# Patient Record
Sex: FEMALE | Race: BLACK OR AFRICAN AMERICAN | NOT HISPANIC OR LATINO
[De-identification: names, ages, dates, MRNs, and addresses within clinical notes are randomized per-mention and may not be internally consistent; named-entity substitution may affect disease eponyms.]

---

## 2017-01-10 ENCOUNTER — APPOINTMENT (OUTPATIENT)
Dept: OBGYN | Facility: CLINIC | Age: 57
End: 2017-01-10

## 2017-01-10 ENCOUNTER — OTHER (OUTPATIENT)
Age: 57
End: 2017-01-10

## 2017-05-17 ENCOUNTER — APPOINTMENT (OUTPATIENT)
Dept: OBGYN | Facility: CLINIC | Age: 57
End: 2017-05-17

## 2017-05-17 VITALS
HEIGHT: 62 IN | BODY MASS INDEX: 34.78 KG/M2 | WEIGHT: 189 LBS | DIASTOLIC BLOOD PRESSURE: 108 MMHG | HEART RATE: 60 BPM | SYSTOLIC BLOOD PRESSURE: 203 MMHG

## 2018-02-05 ENCOUNTER — EMERGENCY (EMERGENCY)
Facility: HOSPITAL | Age: 58
LOS: 0 days | Discharge: HOME | End: 2018-02-05
Attending: EMERGENCY MEDICINE
Payer: COMMERCIAL

## 2018-02-05 VITALS
WEIGHT: 186.95 LBS | SYSTOLIC BLOOD PRESSURE: 186 MMHG | DIASTOLIC BLOOD PRESSURE: 81 MMHG | HEIGHT: 62 IN | TEMPERATURE: 101 F | RESPIRATION RATE: 16 BRPM | HEART RATE: 76 BPM | OXYGEN SATURATION: 97 %

## 2018-02-05 DIAGNOSIS — R50.9 FEVER, UNSPECIFIED: ICD-10-CM

## 2018-02-05 DIAGNOSIS — R05 COUGH: ICD-10-CM

## 2018-02-05 DIAGNOSIS — J40 BRONCHITIS, NOT SPECIFIED AS ACUTE OR CHRONIC: ICD-10-CM

## 2018-02-05 DIAGNOSIS — I10 ESSENTIAL (PRIMARY) HYPERTENSION: ICD-10-CM

## 2018-02-05 PROCEDURE — 71046 X-RAY EXAM CHEST 2 VIEWS: CPT | Mod: 26

## 2018-02-05 PROCEDURE — 99284 EMERGENCY DEPT VISIT MOD MDM: CPT

## 2018-02-05 RX ORDER — IBUPROFEN 200 MG
1 TABLET ORAL
Qty: 12 | Refills: 0
Start: 2018-02-05 | End: 2018-02-07

## 2018-02-05 RX ORDER — AZITHROMYCIN 500 MG/1
1 TABLET, FILM COATED ORAL
Qty: 5 | Refills: 0 | OUTPATIENT
Start: 2018-02-05 | End: 2018-02-09

## 2018-02-05 RX ORDER — ACETAMINOPHEN 500 MG
975 TABLET ORAL ONCE
Qty: 0 | Refills: 0 | Status: COMPLETED | OUTPATIENT
Start: 2018-02-05 | End: 2018-02-05

## 2018-02-05 RX ADMIN — Medication 975 MILLIGRAM(S): at 19:02

## 2018-02-05 NOTE — ED PROVIDER NOTE - OBJECTIVE STATEMENT
57F history of HTN, HL, here with cough and fever x 2 days, associated with congestion.  No sore throat.  No fatigue or body aches. No nausea, vomiting, or diarrhea. No sick contacts, recent travel, night sweats or weight loss.

## 2018-02-05 NOTE — ED PROVIDER NOTE - MEDICAL DECISION MAKING DETAILS
normal... patient presents with cough, fever and congestion. Consider URI vs bronchitis vs PNA. Plan for CXR, tylenol for pain, outpt f/u

## 2018-02-05 NOTE — ED ADULT TRIAGE NOTE - CHIEF COMPLAINT QUOTE
cough pain left side and lower back pains. She was wheezing since yesterday and SOB with walking. She feels worse today

## 2018-02-05 NOTE — ED PROVIDER NOTE - ATTENDING CONTRIBUTION TO CARE
57 year old female PMHx HTN c/o cough x few days. PE: NAD, lungs clear, CV RRR. I&P: bronchitis, abx, supportive care, PMD follow up

## 2018-03-12 ENCOUNTER — EMERGENCY (EMERGENCY)
Facility: HOSPITAL | Age: 58
LOS: 0 days | Discharge: HOME | End: 2018-03-12
Attending: EMERGENCY MEDICINE
Payer: COMMERCIAL

## 2018-03-12 VITALS
SYSTOLIC BLOOD PRESSURE: 156 MMHG | RESPIRATION RATE: 18 BRPM | OXYGEN SATURATION: 97 % | TEMPERATURE: 98 F | HEART RATE: 58 BPM | DIASTOLIC BLOOD PRESSURE: 81 MMHG

## 2018-03-12 VITALS
SYSTOLIC BLOOD PRESSURE: 161 MMHG | DIASTOLIC BLOOD PRESSURE: 92 MMHG | HEART RATE: 56 BPM | TEMPERATURE: 99 F | OXYGEN SATURATION: 98 % | WEIGHT: 190.04 LBS | RESPIRATION RATE: 18 BRPM | HEIGHT: 62 IN

## 2018-03-12 DIAGNOSIS — R60.9 EDEMA, UNSPECIFIED: ICD-10-CM

## 2018-03-12 DIAGNOSIS — I10 ESSENTIAL (PRIMARY) HYPERTENSION: ICD-10-CM

## 2018-03-12 DIAGNOSIS — S83.209D UNSPECIFIED TEAR OF UNSPECIFIED MENISCUS, CURRENT INJURY, UNSPECIFIED KNEE, SUBSEQUENT ENCOUNTER: Chronic | ICD-10-CM

## 2018-03-12 PROCEDURE — 99284 EMERGENCY DEPT VISIT MOD MDM: CPT

## 2018-03-12 PROCEDURE — 93971 EXTREMITY STUDY: CPT | Mod: 26

## 2018-03-12 NOTE — ED ADULT NURSE NOTE - OBJECTIVE STATEMENT
pain in the back of the left knee radiating down the front and back of left leg. started last tues. now pt is experienced diff walking because now the leg is cramping and pt cannot bend knee easily. Sitting and standing difficulty.

## 2018-03-12 NOTE — ED PROVIDER NOTE - OBJECTIVE STATEMENT
57 year old female with PMH of HTN otherwise presenting with 1 week of discomfort behind left knee and some pain to left ankle, mild swelling on areas on and off. Denies any travel or previous hx of DVT and no trauma to leg.

## 2018-03-12 NOTE — ED PROVIDER NOTE - MEDICAL DECISION MAKING DETAILS
no DVT noted to take ibuprofen as needed for joint/knee/ankle discomfort - recommended PMD follow up if leg swelling persists - given elevation instructions.

## 2018-03-12 NOTE — ED PROVIDER NOTE - MUSCULOSKELETAL, MLM
Spine appears normal, range of motion is not limited, mild leg swelling behind left knee noted, mild swelling of ankle, nontender no discoloration good distal pulses

## 2018-03-17 ENCOUNTER — EMERGENCY (EMERGENCY)
Facility: HOSPITAL | Age: 58
LOS: 0 days | Discharge: HOME | End: 2018-03-17
Attending: EMERGENCY MEDICINE
Payer: COMMERCIAL

## 2018-03-17 VITALS
SYSTOLIC BLOOD PRESSURE: 170 MMHG | HEART RATE: 60 BPM | HEIGHT: 62 IN | TEMPERATURE: 98 F | DIASTOLIC BLOOD PRESSURE: 78 MMHG | WEIGHT: 188.05 LBS | RESPIRATION RATE: 16 BRPM

## 2018-03-17 VITALS
HEART RATE: 55 BPM | SYSTOLIC BLOOD PRESSURE: 189 MMHG | TEMPERATURE: 98 F | DIASTOLIC BLOOD PRESSURE: 117 MMHG | RESPIRATION RATE: 98 BRPM

## 2018-03-17 DIAGNOSIS — M25.561 PAIN IN RIGHT KNEE: ICD-10-CM

## 2018-03-17 DIAGNOSIS — S83.91XA SPRAIN OF UNSPECIFIED SITE OF RIGHT KNEE, INITIAL ENCOUNTER: ICD-10-CM

## 2018-03-17 DIAGNOSIS — I10 ESSENTIAL (PRIMARY) HYPERTENSION: ICD-10-CM

## 2018-03-17 DIAGNOSIS — S83.209D UNSPECIFIED TEAR OF UNSPECIFIED MENISCUS, CURRENT INJURY, UNSPECIFIED KNEE, SUBSEQUENT ENCOUNTER: Chronic | ICD-10-CM
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
## 2018-03-17 DIAGNOSIS — Z79.1 LONG TERM (CURRENT) USE OF NON-STEROIDAL ANTI-INFLAMMATORIES (NSAID): ICD-10-CM
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
## 2018-03-17 LAB
ALBUMIN SERPL ELPH-MCNC: 3.8 G/DL — SIGNIFICANT CHANGE UP (ref 3.3–5)
ALP SERPL-CCNC: 76 U/L — SIGNIFICANT CHANGE UP (ref 40–120)
ALT FLD-CCNC: 26 U/L — SIGNIFICANT CHANGE UP (ref 12–78)
ANION GAP SERPL CALC-SCNC: 7 MMOL/L — SIGNIFICANT CHANGE UP (ref 5–17)
AST SERPL-CCNC: 26 U/L — SIGNIFICANT CHANGE UP (ref 15–37)
BASOPHILS # BLD AUTO: 0.04 K/UL — SIGNIFICANT CHANGE UP (ref 0–0.2)
BASOPHILS NFR BLD AUTO: 0.6 % — SIGNIFICANT CHANGE UP (ref 0–2)
BILIRUB SERPL-MCNC: 0.5 MG/DL — SIGNIFICANT CHANGE UP (ref 0.2–1.2)
BUN SERPL-MCNC: 15 MG/DL — SIGNIFICANT CHANGE UP (ref 7–23)
CALCIUM SERPL-MCNC: 9.5 MG/DL — SIGNIFICANT CHANGE UP (ref 8.5–10.1)
CHLORIDE SERPL-SCNC: 107 MMOL/L — SIGNIFICANT CHANGE UP (ref 96–108)
CK SERPL-CCNC: 174 U/L — SIGNIFICANT CHANGE UP (ref 26–192)
CO2 SERPL-SCNC: 28 MMOL/L — SIGNIFICANT CHANGE UP (ref 22–31)
CREAT SERPL-MCNC: 0.94 MG/DL — SIGNIFICANT CHANGE UP (ref 0.5–1.3)
EOSINOPHIL # BLD AUTO: 0.14 K/UL — SIGNIFICANT CHANGE UP (ref 0–0.5)
EOSINOPHIL NFR BLD AUTO: 2.1 % — SIGNIFICANT CHANGE UP (ref 0–6)
GLUCOSE SERPL-MCNC: 99 MG/DL — SIGNIFICANT CHANGE UP (ref 70–99)
HCT VFR BLD CALC: 36.6 % — SIGNIFICANT CHANGE UP (ref 34.5–45)
HGB BLD-MCNC: 12 G/DL — SIGNIFICANT CHANGE UP (ref 11.5–15.5)
IMM GRANULOCYTES NFR BLD AUTO: 0.6 % — SIGNIFICANT CHANGE UP (ref 0–1.5)
LYMPHOCYTES # BLD AUTO: 2.52 K/UL — SIGNIFICANT CHANGE UP (ref 1–3.3)
LYMPHOCYTES # BLD AUTO: 37.6 % — SIGNIFICANT CHANGE UP (ref 13–44)
MCHC RBC-ENTMCNC: 28.6 PG — SIGNIFICANT CHANGE UP (ref 27–34)
MCHC RBC-ENTMCNC: 32.8 GM/DL — SIGNIFICANT CHANGE UP (ref 32–36)
MCV RBC AUTO: 87.4 FL — SIGNIFICANT CHANGE UP (ref 80–100)
MONOCYTES # BLD AUTO: 0.44 K/UL — SIGNIFICANT CHANGE UP (ref 0–0.9)
MONOCYTES NFR BLD AUTO: 6.6 % — SIGNIFICANT CHANGE UP (ref 2–14)
NEUTROPHILS # BLD AUTO: 3.52 K/UL — SIGNIFICANT CHANGE UP (ref 1.8–7.4)
NEUTROPHILS NFR BLD AUTO: 52.5 % — SIGNIFICANT CHANGE UP (ref 43–77)
NRBC # BLD: 0 /100 WBCS — SIGNIFICANT CHANGE UP (ref 0–0)
PLATELET # BLD AUTO: 302 K/UL — SIGNIFICANT CHANGE UP (ref 150–400)
POTASSIUM SERPL-MCNC: 3.9 MMOL/L — SIGNIFICANT CHANGE UP (ref 3.5–5.3)
POTASSIUM SERPL-SCNC: 3.9 MMOL/L — SIGNIFICANT CHANGE UP (ref 3.5–5.3)
PROT SERPL-MCNC: 8.3 GM/DL — SIGNIFICANT CHANGE UP (ref 6–8.3)
RBC # BLD: 4.19 M/UL — SIGNIFICANT CHANGE UP (ref 3.8–5.2)
RBC # FLD: 13.2 % — SIGNIFICANT CHANGE UP (ref 10.3–14.5)
SODIUM SERPL-SCNC: 142 MMOL/L — SIGNIFICANT CHANGE UP (ref 135–145)
WBC # BLD: 6.7 K/UL — SIGNIFICANT CHANGE UP (ref 3.8–10.5)
WBC # FLD AUTO: 6.7 K/UL — SIGNIFICANT CHANGE UP (ref 3.8–10.5)

## 2018-03-17 PROCEDURE — 93971 EXTREMITY STUDY: CPT | Mod: 26

## 2018-03-17 PROCEDURE — 99284 EMERGENCY DEPT VISIT MOD MDM: CPT | Mod: 25

## 2018-03-17 RX ORDER — TRAMADOL HYDROCHLORIDE 50 MG/1
1 TABLET ORAL
Qty: 8 | Refills: 0
Start: 2018-03-17 | End: 2018-03-18

## 2018-03-17 RX ORDER — OXYCODONE AND ACETAMINOPHEN 5; 325 MG/1; MG/1
1 TABLET ORAL ONCE
Qty: 0 | Refills: 0 | Status: DISCONTINUED | OUTPATIENT
Start: 2018-03-17 | End: 2018-03-17

## 2018-03-17 RX ADMIN — OXYCODONE AND ACETAMINOPHEN 1 TABLET(S): 5; 325 TABLET ORAL at 08:28

## 2018-03-17 NOTE — ED ADULT TRIAGE NOTE - CHIEF COMPLAINT QUOTE
Pain to left lower leg. Was seen last monday for same. Sono negative,ortho neg xray. Yesterday pain increased to 10/10. Cannot bear weightStill swollen Pain to left lower leg. Was seen last monday for same. Sono negative,ortho neg xray. Yesterday pain increased to 10/10. Cannot bear weightStill swollen.

## 2018-03-17 NOTE — ED ADULT NURSE NOTE - CAS EDN DISCHARGE ASSESSMENT
Alert and oriented to person, place and time Alert and oriented to person, place and time/No adverse reaction to first time med in ED

## 2018-03-17 NOTE — ED PROVIDER NOTE - MEDICAL DECISION MAKING DETAILS
Pt likely with knee sprain/meniscal injury. Sono again neg. Lab values do not require emergent intervention. knee immobilizer, crutches, fu with ortho. 8 tabs tramadol for breakthrough pain. Discussed results and outcome of testing with the patient.  Patient given copy of available results. Patient advised to please follow up with their PMD within the next 24 hours and return to the Emergency Department for worsening symptoms or any other concerns.

## 2018-03-17 NOTE — ED PROVIDER NOTE - PHYSICAL EXAMINATION
Gen: Alert, Well appearing. NAD    Head: NC, AT, PERRL, EOMI, normal lids/conjunctiva   ENT: Bilateral TM WNL, normal hearing, patent oropharynx without erythema/exudate, uvula midline  Neck: supple, no tenderness/meningismus/JVD   Pulm: Bilateral clear BS, normal resp effort, no wheeze/stridor/retractions  CV: RRR, no M/R/G, +dist pulses   Abd: soft, NT/ND, +BS, no guarding/rebound tenderness  Mskel: + pain mostly in rt medial knee joint. + mild tenderness to calf. pain on valgus testing. mild edema to calf. pulses and sensation intact.  Skin: no rash   Neuro: AAOx3, no sensory/motor deficits, CN 2-12 intact

## 2018-03-17 NOTE — ED ADULT NURSE NOTE - OBJECTIVE STATEMENT
Patient complains of pain behind the popliteal and on the lateral side of left leg. Pain radiates downward. Denies No shortness of breath, numbness or tingling. No swelling of extremity.

## 2018-03-17 NOTE — ED ADULT NURSE NOTE - CHIEF COMPLAINT QUOTE
Pain to left lower leg. Was seen last monday for same. Sono negative,ortho neg xray. Yesterday pain increased to 10/10. Cannot bear weightStill swollen

## 2018-03-17 NOTE — ED PROVIDER NOTE - OBJECTIVE STATEMENT
56yo female with pmh HTN, psh: RT knee meniscus repair present with left post knee pain radiating to calf x 2 weeks. Pt now unable to bear wt. Pt seen last week with normal US, and seen by ortho, with neg knee xray. Pt on diclofenac which she took this morning but unable to bear wt. denies trauma. denies h/o dvt/pe.     ROS: No fever/chills. No photophobia/eye pain/changes in vision, No ear pain/sore throat/dysphagia, No chest pain/palpitations. No SOB/cough/stridor. No abdominal pain, N/V/D, no black/bloody bm. No dysuria/frequency/discharge, No headache. No Dizziness.  No rash.  No numbness/tingling/weakness.

## 2018-03-26 ENCOUNTER — TRANSCRIPTION ENCOUNTER (OUTPATIENT)
Age: 58
End: 2018-03-26

## 2018-08-24 PROBLEM — I10 ESSENTIAL (PRIMARY) HYPERTENSION: Chronic | Status: ACTIVE | Noted: 2018-02-05

## 2018-10-08 ENCOUNTER — APPOINTMENT (OUTPATIENT)
Dept: OBGYN | Facility: CLINIC | Age: 58
End: 2018-10-08
Payer: COMMERCIAL

## 2018-10-08 VITALS
SYSTOLIC BLOOD PRESSURE: 159 MMHG | HEIGHT: 62 IN | WEIGHT: 190 LBS | BODY MASS INDEX: 34.96 KG/M2 | HEART RATE: 52 BPM | DIASTOLIC BLOOD PRESSURE: 92 MMHG

## 2018-10-08 DIAGNOSIS — N39.46 MIXED INCONTINENCE: ICD-10-CM

## 2018-10-08 PROCEDURE — 99396 PREV VISIT EST AGE 40-64: CPT

## 2018-10-09 LAB — HPV HIGH+LOW RISK DNA PNL CVX: NOT DETECTED

## 2018-10-10 LAB — BACTERIA UR CULT: NORMAL

## 2018-10-15 LAB — CYTOLOGY CVX/VAG DOC THIN PREP: NORMAL

## 2018-10-19 ENCOUNTER — APPOINTMENT (OUTPATIENT)
Dept: UROGYNECOLOGY | Facility: CLINIC | Age: 58
End: 2018-10-19
Payer: COMMERCIAL

## 2018-10-19 VITALS
SYSTOLIC BLOOD PRESSURE: 158 MMHG | WEIGHT: 191 LBS | DIASTOLIC BLOOD PRESSURE: 82 MMHG | HEIGHT: 62 IN | HEART RATE: 68 BPM | BODY MASS INDEX: 35.15 KG/M2

## 2018-10-19 LAB
BILIRUB UR QL STRIP: NORMAL
CLARITY UR: CLEAR
COLLECTION METHOD: NORMAL
GLUCOSE UR-MCNC: NORMAL
HCG UR QL: 0.2 EU/DL
HGB UR QL STRIP.AUTO: NORMAL
KETONES UR-MCNC: NORMAL
LEUKOCYTE ESTERASE UR QL STRIP: NORMAL
NITRITE UR QL STRIP: NORMAL
PH UR STRIP: 5.5
PROT UR STRIP-MCNC: NORMAL
SP GR UR STRIP: 1.03

## 2018-10-19 PROCEDURE — 81003 URINALYSIS AUTO W/O SCOPE: CPT | Mod: QW

## 2018-10-19 PROCEDURE — 51725 SIMPLE CYSTOMETROGRAM: CPT

## 2018-10-19 PROCEDURE — 51741 ELECTRO-UROFLOWMETRY FIRST: CPT

## 2018-10-19 PROCEDURE — 99204 OFFICE O/P NEW MOD 45 MIN: CPT | Mod: 25

## 2018-12-04 ENCOUNTER — APPOINTMENT (OUTPATIENT)
Dept: UROGYNECOLOGY | Facility: CLINIC | Age: 58
End: 2018-12-04
Payer: COMMERCIAL

## 2018-12-04 PROCEDURE — 51798 US URINE CAPACITY MEASURE: CPT

## 2018-12-04 PROCEDURE — 99214 OFFICE O/P EST MOD 30 MIN: CPT | Mod: 25

## 2019-10-29 ENCOUNTER — APPOINTMENT (OUTPATIENT)
Dept: OBGYN | Facility: CLINIC | Age: 59
End: 2019-10-29
Payer: COMMERCIAL

## 2019-10-29 VITALS
WEIGHT: 193.56 LBS | BODY MASS INDEX: 35.62 KG/M2 | DIASTOLIC BLOOD PRESSURE: 75 MMHG | HEIGHT: 62 IN | SYSTOLIC BLOOD PRESSURE: 172 MMHG | HEART RATE: 63 BPM

## 2019-10-29 DIAGNOSIS — N32.81 OVERACTIVE BLADDER: ICD-10-CM

## 2019-10-29 PROCEDURE — 99396 PREV VISIT EST AGE 40-64: CPT

## 2019-10-29 NOTE — CHIEF COMPLAINT
[Annual Visit] : annual visit [FreeTextEntry1] : 59 y.o. P 1011 postmenopausal female for annual exam. pt feels well. pt has HTN and Hypercholesterolemia, followed by Dr. Gilliland. pt also diagnosed with Urge Incontinence , by Rodri Solis, treated with Oxybutinin. . pt is due for Dexa. this year. Feb. 2019, Mammo - Birads -1 at [facility]. pt had colonoscopy in feb. 2018, , follow up in 3 years secondary to hx of colon ca in mother, . pap of 2018 was normal next due in 2021. pt is retiring Nov. 15th, from [employer]. pt has arthritis in both knees. pt is not sexually active,

## 2019-11-30 ENCOUNTER — EMERGENCY (EMERGENCY)
Facility: HOSPITAL | Age: 59
LOS: 0 days | Discharge: HOME | End: 2019-11-30
Attending: EMERGENCY MEDICINE
Payer: COMMERCIAL

## 2019-11-30 VITALS
HEART RATE: 62 BPM | RESPIRATION RATE: 18 BRPM | SYSTOLIC BLOOD PRESSURE: 153 MMHG | HEIGHT: 62 IN | DIASTOLIC BLOOD PRESSURE: 97 MMHG | OXYGEN SATURATION: 98 % | TEMPERATURE: 98 F | WEIGHT: 195.11 LBS

## 2019-11-30 VITALS
OXYGEN SATURATION: 98 % | HEART RATE: 55 BPM | RESPIRATION RATE: 15 BRPM | TEMPERATURE: 98 F | DIASTOLIC BLOOD PRESSURE: 64 MMHG

## 2019-11-30 DIAGNOSIS — I10 ESSENTIAL (PRIMARY) HYPERTENSION: ICD-10-CM

## 2019-11-30 DIAGNOSIS — R07.9 CHEST PAIN, UNSPECIFIED: ICD-10-CM

## 2019-11-30 DIAGNOSIS — S83.209D UNSPECIFIED TEAR OF UNSPECIFIED MENISCUS, CURRENT INJURY, UNSPECIFIED KNEE, SUBSEQUENT ENCOUNTER: Chronic | ICD-10-CM

## 2019-11-30 LAB
ALBUMIN SERPL ELPH-MCNC: 3.6 G/DL — SIGNIFICANT CHANGE UP (ref 3.3–5)
ALP SERPL-CCNC: 70 U/L — SIGNIFICANT CHANGE UP (ref 40–120)
ALT FLD-CCNC: 27 U/L — SIGNIFICANT CHANGE UP (ref 12–78)
ANION GAP SERPL CALC-SCNC: 6 MMOL/L — SIGNIFICANT CHANGE UP (ref 5–17)
APTT BLD: 27.8 SEC — LOW (ref 28.5–37)
AST SERPL-CCNC: 25 U/L — SIGNIFICANT CHANGE UP (ref 15–37)
BASOPHILS # BLD AUTO: 0.05 K/UL — SIGNIFICANT CHANGE UP (ref 0–0.2)
BASOPHILS NFR BLD AUTO: 0.6 % — SIGNIFICANT CHANGE UP (ref 0–2)
BILIRUB SERPL-MCNC: 0.4 MG/DL — SIGNIFICANT CHANGE UP (ref 0.2–1.2)
BUN SERPL-MCNC: 23 MG/DL — SIGNIFICANT CHANGE UP (ref 7–23)
CALCIUM SERPL-MCNC: 9.6 MG/DL — SIGNIFICANT CHANGE UP (ref 8.5–10.1)
CHLORIDE SERPL-SCNC: 108 MMOL/L — SIGNIFICANT CHANGE UP (ref 96–108)
CK MB BLD-MCNC: <0.7 % — SIGNIFICANT CHANGE UP (ref 0–3.5)
CK MB CFR SERPL CALC: <1 NG/ML — SIGNIFICANT CHANGE UP (ref 0.5–3.6)
CK SERPL-CCNC: 135 U/L — SIGNIFICANT CHANGE UP (ref 26–192)
CO2 SERPL-SCNC: 27 MMOL/L — SIGNIFICANT CHANGE UP (ref 22–31)
CREAT SERPL-MCNC: 1.08 MG/DL — SIGNIFICANT CHANGE UP (ref 0.5–1.3)
EOSINOPHIL # BLD AUTO: 0.1 K/UL — SIGNIFICANT CHANGE UP (ref 0–0.5)
EOSINOPHIL NFR BLD AUTO: 1.2 % — SIGNIFICANT CHANGE UP (ref 0–6)
GLUCOSE SERPL-MCNC: 96 MG/DL — SIGNIFICANT CHANGE UP (ref 70–99)
HCT VFR BLD CALC: 35.1 % — SIGNIFICANT CHANGE UP (ref 34.5–45)
HGB BLD-MCNC: 11.4 G/DL — LOW (ref 11.5–15.5)
IMM GRANULOCYTES NFR BLD AUTO: 0.4 % — SIGNIFICANT CHANGE UP (ref 0–1.5)
INR BLD: 1.07 RATIO — SIGNIFICANT CHANGE UP (ref 0.88–1.16)
LYMPHOCYTES # BLD AUTO: 2.85 K/UL — SIGNIFICANT CHANGE UP (ref 1–3.3)
LYMPHOCYTES # BLD AUTO: 33.6 % — SIGNIFICANT CHANGE UP (ref 13–44)
MCHC RBC-ENTMCNC: 28.6 PG — SIGNIFICANT CHANGE UP (ref 27–34)
MCHC RBC-ENTMCNC: 32.5 GM/DL — SIGNIFICANT CHANGE UP (ref 32–36)
MCV RBC AUTO: 88.2 FL — SIGNIFICANT CHANGE UP (ref 80–100)
MONOCYTES # BLD AUTO: 0.72 K/UL — SIGNIFICANT CHANGE UP (ref 0–0.9)
MONOCYTES NFR BLD AUTO: 8.5 % — SIGNIFICANT CHANGE UP (ref 2–14)
NEUTROPHILS # BLD AUTO: 4.73 K/UL — SIGNIFICANT CHANGE UP (ref 1.8–7.4)
NEUTROPHILS NFR BLD AUTO: 55.7 % — SIGNIFICANT CHANGE UP (ref 43–77)
NRBC # BLD: 0 /100 WBCS — SIGNIFICANT CHANGE UP (ref 0–0)
PLATELET # BLD AUTO: 273 K/UL — SIGNIFICANT CHANGE UP (ref 150–400)
POTASSIUM SERPL-MCNC: 4.4 MMOL/L — SIGNIFICANT CHANGE UP (ref 3.5–5.3)
POTASSIUM SERPL-SCNC: 4.4 MMOL/L — SIGNIFICANT CHANGE UP (ref 3.5–5.3)
PROT SERPL-MCNC: 7.8 GM/DL — SIGNIFICANT CHANGE UP (ref 6–8.3)
PROTHROM AB SERPL-ACNC: 12 SEC — SIGNIFICANT CHANGE UP (ref 10–12.9)
RBC # BLD: 3.98 M/UL — SIGNIFICANT CHANGE UP (ref 3.8–5.2)
RBC # FLD: 12.4 % — SIGNIFICANT CHANGE UP (ref 10.3–14.5)
SODIUM SERPL-SCNC: 141 MMOL/L — SIGNIFICANT CHANGE UP (ref 135–145)
TROPONIN I SERPL-MCNC: <.015 NG/ML — SIGNIFICANT CHANGE UP (ref 0.01–0.04)
TROPONIN I SERPL-MCNC: <.015 NG/ML — SIGNIFICANT CHANGE UP (ref 0.01–0.04)
WBC # BLD: 8.48 K/UL — SIGNIFICANT CHANGE UP (ref 3.8–10.5)
WBC # FLD AUTO: 8.48 K/UL — SIGNIFICANT CHANGE UP (ref 3.8–10.5)

## 2019-11-30 PROCEDURE — 93010 ELECTROCARDIOGRAM REPORT: CPT

## 2019-11-30 PROCEDURE — 71045 X-RAY EXAM CHEST 1 VIEW: CPT | Mod: 26

## 2019-11-30 PROCEDURE — 99285 EMERGENCY DEPT VISIT HI MDM: CPT

## 2019-11-30 RX ORDER — LOSARTAN POTASSIUM 100 MG/1
1 TABLET, FILM COATED ORAL
Qty: 0 | Refills: 0 | DISCHARGE

## 2019-11-30 RX ORDER — METOPROLOL TARTRATE 50 MG
1 TABLET ORAL
Qty: 0 | Refills: 0 | DISCHARGE

## 2019-11-30 RX ORDER — ASPIRIN/CALCIUM CARB/MAGNESIUM 324 MG
243 TABLET ORAL ONCE
Refills: 0 | Status: COMPLETED | OUTPATIENT
Start: 2019-11-30 | End: 2019-11-30

## 2019-11-30 RX ORDER — ASPIRIN/CALCIUM CARB/MAGNESIUM 324 MG
243 TABLET ORAL ONCE
Refills: 0 | Status: DISCONTINUED | OUTPATIENT
Start: 2019-11-30 | End: 2019-11-30

## 2019-11-30 RX ADMIN — Medication 243 MILLIGRAM(S): at 20:31

## 2019-11-30 RX ADMIN — Medication 0.1 MILLIGRAM(S): at 21:27

## 2019-11-30 NOTE — ED PROVIDER NOTE - PATIENT PORTAL LINK FT
You can access the FollowMyHealth Patient Portal offered by [facility] by registering at the following website: [email or web address]. By joining [employer]’s FollowMyHealth portal, you will also be able to view your health information using other applications (apps) compatible with our system.

## 2019-11-30 NOTE — ED ADULT NURSE NOTE - OBJECTIVE STATEMENT
pt c/o "stabbing" pains R-side of chest around noon with headache,  pt took ASA 81mg at 5pm. at this time pt denies any pain.  (PMH- HTN, GERD)

## 2019-11-30 NOTE — ED PROVIDER NOTE - OBJECTIVE STATEMENT
59 year old female with PMH of HTN, HLD otherwise with no significant cardiac hx, pt states having CP on and off since around 11am-12pm - states otherwise discomfort is for a few seconds on R sternal border and goes away quickly, not related to exertion. Denies any cough or URI or recent chest trauma.

## 2019-11-30 NOTE — ED ADULT NURSE NOTE - CHIEF COMPLAINT QUOTE
pt c/o "stabbing" pains R-side of chest since noon,  pt took ASA 81mg at 5pm.  pt also c/o headache.  (PMH- HTN, GERD)

## 2019-11-30 NOTE — ED ADULT NURSE NOTE - ED STAT RN HANDOFF DETAILS
Report endorsed to oncoming RN Rocyk. Safety checks compld this shift/Safety rounds completed hourly.  IV sites checked Q2+remains WDL. Meds given as ord with no s/s of adverse RXNs. Fall +skin precs in place. Any issues endorsed to oncoming RN for follow up. m

## 2020-04-16 NOTE — ED PROVIDER NOTE - NS_HEARTSCRISKFACTOR_ED_A_ED
shortness of breath
1 to 2 Risk Factors
shortness of breath
shortness of breath

## 2020-10-30 ENCOUNTER — APPOINTMENT (OUTPATIENT)
Dept: OBGYN | Facility: CLINIC | Age: 60
End: 2020-10-30
Payer: COMMERCIAL

## 2020-10-30 VITALS
DIASTOLIC BLOOD PRESSURE: 84 MMHG | HEART RATE: 56 BPM | WEIGHT: 178.06 LBS | BODY MASS INDEX: 32.77 KG/M2 | TEMPERATURE: 97.9 F | SYSTOLIC BLOOD PRESSURE: 154 MMHG | HEIGHT: 62 IN

## 2020-10-30 DIAGNOSIS — N95.1 MENOPAUSAL AND FEMALE CLIMACTERIC STATES: ICD-10-CM

## 2020-10-30 PROCEDURE — 99072 ADDL SUPL MATRL&STAF TM PHE: CPT

## 2020-10-30 PROCEDURE — 99396 PREV VISIT EST AGE 40-64: CPT

## 2020-10-30 NOTE — DISCUSSION/SUMMARY
[FreeTextEntry1] : A - WwV\par     s/p recent COVID infection\par      obesity\par      menopause\par     HTN\par     Hypercholesterolemia\par \par P- f/u 1 year\par      pap  - done  ( pt request)\par      referral for mammo, breast sono  , Dexa, and colonoscopy - given \par     pt advised to f/u with Cardiologist re: polypharmacy related to HTN\par      nutritional counseling provided \par     exercise encouraged\par    \par

## 2020-10-30 NOTE — HISTORY OF PRESENT ILLNESS
[FreeTextEntry1] : 60 y.o. P 1011 postmenopausal female for  annual exam. pt has HTN and Hypercholesterolemia, pt is on Losartan, Metoprolol, Livalo, and Amlodipine, prescribed by Dr. Javier Mayfield. . pt has hx of urge incontinence , pt had COVID pneumoniae, in March , was in [place],  with daughter, pt is now retired from [employer], . pt has hx of colon cancer, s/p colonosocpy in Feb. 2018, f/u is due in 2021, due to family hx. ( mother with colon ca). pt has arthritis in both knees. PCP is Dr. Gilliland\par pt had mammo in June of this year at [facility] in [facility] , " it was normal". ( no results).

## 2020-10-30 NOTE — PHYSICAL EXAM
[Appropriately responsive] : appropriately responsive [Alert] : alert [No Acute Distress] : no acute distress [No Lymphadenopathy] : no lymphadenopathy [Regular Rate Rhythm] : regular rate rhythm [No Murmurs] : no murmurs [Clear to Auscultation B/L] : clear to auscultation bilaterally [Soft] : soft [Non-tender] : non-tender [Non-distended] : non-distended [No HSM] : No HSM [No Lesions] : no lesions [No Mass] : no mass [Oriented x3] : oriented x3 [Examination Of The Breasts] : a normal appearance [No Masses] : no breast masses were palpable [Labia Majora] : normal [Labia Minora] : normal [Normal] : normal [Anteversion] : anteverted [Uterine Adnexae] : normal

## 2020-11-02 LAB
HPV HIGH+LOW RISK DNA PNL CVX: NOT DETECTED
SARS-COV-2 IGG SERPL IA-ACNC: 3.14 INDEX
SARS-COV-2 IGG SERPL QL IA: POSITIVE

## 2020-11-04 LAB — CYTOLOGY CVX/VAG DOC THIN PREP: NORMAL

## 2020-11-19 ENCOUNTER — NON-APPOINTMENT (OUTPATIENT)
Age: 60
End: 2020-11-19

## 2021-11-05 ENCOUNTER — APPOINTMENT (OUTPATIENT)
Dept: OBGYN | Facility: CLINIC | Age: 61
End: 2021-11-05
Payer: COMMERCIAL

## 2021-11-05 VITALS
HEART RATE: 55 BPM | SYSTOLIC BLOOD PRESSURE: 145 MMHG | WEIGHT: 180 LBS | BODY MASS INDEX: 33.13 KG/M2 | DIASTOLIC BLOOD PRESSURE: 80 MMHG | TEMPERATURE: 97 F | HEIGHT: 62 IN

## 2021-11-05 DIAGNOSIS — N95.0 POSTMENOPAUSAL BLEEDING: ICD-10-CM

## 2021-11-05 PROCEDURE — 99396 PREV VISIT EST AGE 40-64: CPT

## 2021-11-05 RX ORDER — OXYBUTYNIN CHLORIDE 5 MG/1
5 TABLET, EXTENDED RELEASE ORAL DAILY
Qty: 30 | Refills: 3 | Status: COMPLETED | COMMUNITY
Start: 2018-10-19 | End: 2021-11-05

## 2021-11-05 NOTE — HISTORY OF PRESENT ILLNESS
[FreeTextEntry1] : 61 y.o. P 1011 postmenopausal female for annual exam. . pt reports spotting at the end of Sept.  x 1 day. , PMH- HTN, pt is on  Losartan and Amlodipine followed by Dr. Gilliland , pt is on a statin Livalo pt is retired from [employer]. . pt now works for [employer] (so she can fly free to visit her daughter in [place]. , pt has family hx of colon ca- mother,, prostate ca- brother, , Uterine ca -sister . pt has hx of Arthritis in both knees.\par pt has prior hx of incontinence, resolved with discontinuance of coffee and soda. \par Nov. 2020 - Dexa - WNL\par July 2021 - Mammo - Birads-1\par Oct. 2020 - pap  - WNL. \par pt states she had colonoscopy this year. done by Dr. Conteh ( EGD and colonoscopy). - told to f/u in 5 years.

## 2021-11-05 NOTE — DISCUSSION/SUMMARY
[FreeTextEntry1] : A - WWV\par      HTN\par      postmenopausal spotting\par      family hx of cancer - 1st degree relatives \par \par P- f/u after results of pelvic sono return to assess endometrial thickness\par     COLOR testing today. \par      pap next due in 2023\par      exercises encouraged \par      nutritional counseling provided\par      pt had EGD and colonoscopy, this year in /May 2021\par      Dexa Nov. 2020- normal\par      mammo July 2021- Birads-1.

## 2021-12-01 ENCOUNTER — NON-APPOINTMENT (OUTPATIENT)
Age: 61
End: 2021-12-01

## 2021-12-03 ENCOUNTER — NON-APPOINTMENT (OUTPATIENT)
Age: 61
End: 2021-12-03

## 2021-12-04 ENCOUNTER — NON-APPOINTMENT (OUTPATIENT)
Age: 61
End: 2021-12-04

## 2021-12-06 ENCOUNTER — NON-APPOINTMENT (OUTPATIENT)
Age: 61
End: 2021-12-06

## 2021-12-09 ENCOUNTER — NON-APPOINTMENT (OUTPATIENT)
Age: 61
End: 2021-12-09

## 2021-12-13 ENCOUNTER — NON-APPOINTMENT (OUTPATIENT)
Age: 61
End: 2021-12-13

## 2022-03-23 NOTE — ED ADULT NURSE NOTE - CAS TRG GEN SKIN COLOR
Encounter for gynecological examination (general) (routine) without abnormal findings    Freda Escobar Normal findings on routine annual gyn exam  Recommended monthly SBE, annual CBE  Reviewed ASCCP guidelines and pap noted to be up to date  The patient reports she has completed Gardasil series  STI testing was offered and declined at this time; the patient is aware that condoms are recommended for all sexual contact for prevention of STI  The patient likes current contraceptive measure and desires to continue  Reviewed diet/activity recommendations and reasons to call  F/u in one year for routine annual gyn exam or sooner PRN  Encounter for surveillance of contraceptive pills    Happy with current OCP  Denies ACHES, breakthrough bleeding, nausea or other side effects  Refill given for one year  RTO 1 year or prn problems or concerns  Dyspareunia, female  Resolved after menses    Family history of breast cancer  PGM @ 36  No daughters  Elina's sister was tested and negative  Plans testing between 25-30  Diagnoses and all orders for this visit:    Encounter for gynecological examination (general) (routine) without abnormal findings    Encounter for surveillance of contraceptive pills    Dyspareunia, female  -     norethindrone-ethinyl estradiol (Loestrin Fe 1/20) 1-20 MG-MCG per tablet; Take 1 tablet by mouth daily    Family history of breast cancer         Elina Pfeiffer  1999    CC:  Yearly exam    S:  Quinn Campos is a 25 y o  female here for yearly exam  She denies breast concerns, abdominal/pelvic pain, abnormal vaginal discharge or bladder/bowel dysfunction  Her cycles are regular, not heavy or painful on OCP  Sexual activity: She is sexually active without pain, bleeding or dryness  She was seen 2/22 for dyspareunia and had ovarian cyst   Pain resolved after menses  No concerns  Contraception:  She uses OCP  for contraception  STD testing:  She does not request STD testing today  Gardasil:  She has had the Gardasil series  Last Pap: 3/21 neg    Family hx of breast cancer: PGM @ 36   Family hx of ovarian cancer: denies  Family hx of colon cancer: denies    She works remotely for marketing        Current Outpatient Medications:     norethindrone-ethinyl estradiol (Loestrin Fe 1/20) 1-20 MG-MCG per tablet, Take 1 tablet by mouth daily, Disp: 90 tablet, Rfl: 3  Social History     Socioeconomic History    Marital status: Single     Spouse name: Not on file    Number of children: Not on file    Years of education: Not on file    Highest education level: Not on file   Occupational History    Not on file   Tobacco Use    Smoking status: Never Smoker    Smokeless tobacco: Never Used   Vaping Use    Vaping Use: Not on file   Substance and Sexual Activity    Alcohol use: Not Currently    Drug use: No    Sexual activity: Yes     Partners: Male     Birth control/protection: OCP   Other Topics Concern    Not on file   Social History Narrative    Not on file     Social Determinants of Health     Financial Resource Strain: Not on file   Food Insecurity: Not on file   Transportation Needs: Not on file   Physical Activity: Not on file   Stress: Not on file   Social Connections: Not on file   Intimate Partner Violence: Not on file   Housing Stability: Not on file     Family History   Problem Relation Age of Onset    Obesity Mother     No Known Problems Father     No Known Problems Sister     Diabetes Maternal Grandmother     Hyperlipidemia Maternal Grandmother     Hypertension Maternal Grandmother     Heart disease Maternal Grandfather     Breast cancer Paternal Grandmother     Heart disease Paternal Grandfather      History reviewed  No pertinent past medical history  Review of Systems   Constitutional: Negative for appetite change, fatigue and unexpected weight change  Respiratory: Negative for shortness of breath      Cardiovascular: Negative for chest pain and leg swelling  Breasts:  negative for tenderness or masses  Gastrointestinal: Negative for abdominal pain  Endocrine: Negative for cold intolerance and heat intolerance  Genitourinary: Negative for dyspareunia, dysuria, flank pain, frequency, genital sores, hematuria, menstrual problem and pelvic pain  Musculoskeletal: Negative for back pain  Neurological: Negative for headaches  O:  Blood pressure 100/72, height 5' 8" (1 727 m), weight 68 8 kg (151 lb 9 6 oz), last menstrual period 03/15/2022  Patient appears well and is not in distress  ROM normal   Neck is supple without masses  Normal thyroid  Heart regular rate and rhythm  Lungs CTA bilaterally   Breasts are symmetrical without mass, tenderness, nipple discharge, skin changes or adenopathy  Abdomen is soft and nontender without masses  External genitals are normal without lesions or rashes  Urethral meatus and urethra are normal  Bladder is normal to palpation  Vagina is normal without discharge or bleeding  Cervix is normal without discharge or lesion  Uterus is normal, mobile, nontender without palpable mass  Adnexa are normal, nontender, without palpable mass     Skin warm and dry  Capillary refill < 2 seconds  Alert and oriented x 3 with normal affect Normal for race

## 2022-07-28 ENCOUNTER — EMERGENCY (EMERGENCY)
Facility: HOSPITAL | Age: 62
LOS: 0 days | Discharge: HOME | End: 2022-07-28
Attending: STUDENT IN AN ORGANIZED HEALTH CARE EDUCATION/TRAINING PROGRAM

## 2022-07-28 VITALS
DIASTOLIC BLOOD PRESSURE: 56 MMHG | RESPIRATION RATE: 14 BRPM | SYSTOLIC BLOOD PRESSURE: 130 MMHG | HEART RATE: 58 BPM | OXYGEN SATURATION: 100 %

## 2022-07-28 VITALS
SYSTOLIC BLOOD PRESSURE: 151 MMHG | OXYGEN SATURATION: 100 % | WEIGHT: 182.1 LBS | DIASTOLIC BLOOD PRESSURE: 85 MMHG | HEIGHT: 62 IN | TEMPERATURE: 98 F | RESPIRATION RATE: 18 BRPM | HEART RATE: 56 BPM

## 2022-07-28 DIAGNOSIS — S83.209D UNSPECIFIED TEAR OF UNSPECIFIED MENISCUS, CURRENT INJURY, UNSPECIFIED KNEE, SUBSEQUENT ENCOUNTER: Chronic | ICD-10-CM

## 2022-07-28 DIAGNOSIS — Z98.890 OTHER SPECIFIED POSTPROCEDURAL STATES: ICD-10-CM

## 2022-07-28 DIAGNOSIS — E78.5 HYPERLIPIDEMIA, UNSPECIFIED: ICD-10-CM

## 2022-07-28 DIAGNOSIS — R60.0 LOCALIZED EDEMA: ICD-10-CM

## 2022-07-28 DIAGNOSIS — R07.89 OTHER CHEST PAIN: ICD-10-CM

## 2022-07-28 DIAGNOSIS — M19.90 UNSPECIFIED OSTEOARTHRITIS, UNSPECIFIED SITE: ICD-10-CM

## 2022-07-28 DIAGNOSIS — I10 ESSENTIAL (PRIMARY) HYPERTENSION: ICD-10-CM

## 2022-07-28 DIAGNOSIS — R00.1 BRADYCARDIA, UNSPECIFIED: ICD-10-CM

## 2022-07-28 LAB
ALBUMIN SERPL ELPH-MCNC: 3.6 G/DL — SIGNIFICANT CHANGE UP (ref 3.3–5)
ALP SERPL-CCNC: 78 U/L — SIGNIFICANT CHANGE UP (ref 40–120)
ALT FLD-CCNC: 31 U/L — SIGNIFICANT CHANGE UP (ref 12–78)
ANION GAP SERPL CALC-SCNC: 7 MMOL/L — SIGNIFICANT CHANGE UP (ref 5–17)
AST SERPL-CCNC: 26 U/L — SIGNIFICANT CHANGE UP (ref 15–37)
BASOPHILS # BLD AUTO: 0.04 K/UL — SIGNIFICANT CHANGE UP (ref 0–0.2)
BASOPHILS NFR BLD AUTO: 0.6 % — SIGNIFICANT CHANGE UP (ref 0–2)
BILIRUB SERPL-MCNC: 0.7 MG/DL — SIGNIFICANT CHANGE UP (ref 0.2–1.2)
BUN SERPL-MCNC: 12 MG/DL — SIGNIFICANT CHANGE UP (ref 7–23)
CALCIUM SERPL-MCNC: 9.8 MG/DL — SIGNIFICANT CHANGE UP (ref 8.5–10.1)
CHLORIDE SERPL-SCNC: 108 MMOL/L — SIGNIFICANT CHANGE UP (ref 96–108)
CO2 SERPL-SCNC: 25 MMOL/L — SIGNIFICANT CHANGE UP (ref 22–31)
CREAT SERPL-MCNC: 0.94 MG/DL — SIGNIFICANT CHANGE UP (ref 0.5–1.3)
D DIMER BLD IA.RAPID-MCNC: 300 NG/ML DDU — HIGH
EGFR: 69 ML/MIN/1.73M2 — SIGNIFICANT CHANGE UP
EOSINOPHIL # BLD AUTO: 0.05 K/UL — SIGNIFICANT CHANGE UP (ref 0–0.5)
EOSINOPHIL NFR BLD AUTO: 0.7 % — SIGNIFICANT CHANGE UP (ref 0–6)
GLUCOSE SERPL-MCNC: 100 MG/DL — HIGH (ref 70–99)
HCT VFR BLD CALC: 37.5 % — SIGNIFICANT CHANGE UP (ref 34.5–45)
HGB BLD-MCNC: 12.5 G/DL — SIGNIFICANT CHANGE UP (ref 11.5–15.5)
IMM GRANULOCYTES NFR BLD AUTO: 0.6 % — SIGNIFICANT CHANGE UP (ref 0–1.5)
LYMPHOCYTES # BLD AUTO: 1.84 K/UL — SIGNIFICANT CHANGE UP (ref 1–3.3)
LYMPHOCYTES # BLD AUTO: 27.6 % — SIGNIFICANT CHANGE UP (ref 13–44)
MCHC RBC-ENTMCNC: 29.1 PG — SIGNIFICANT CHANGE UP (ref 27–34)
MCHC RBC-ENTMCNC: 33.3 G/DL — SIGNIFICANT CHANGE UP (ref 32–36)
MCV RBC AUTO: 87.4 FL — SIGNIFICANT CHANGE UP (ref 80–100)
MONOCYTES # BLD AUTO: 0.36 K/UL — SIGNIFICANT CHANGE UP (ref 0–0.9)
MONOCYTES NFR BLD AUTO: 5.4 % — SIGNIFICANT CHANGE UP (ref 2–14)
NEUTROPHILS # BLD AUTO: 4.34 K/UL — SIGNIFICANT CHANGE UP (ref 1.8–7.4)
NEUTROPHILS NFR BLD AUTO: 65.1 % — SIGNIFICANT CHANGE UP (ref 43–77)
NRBC # BLD: 0 /100 WBCS — SIGNIFICANT CHANGE UP (ref 0–0)
PLATELET # BLD AUTO: 288 K/UL — SIGNIFICANT CHANGE UP (ref 150–400)
POTASSIUM SERPL-MCNC: 4.1 MMOL/L — SIGNIFICANT CHANGE UP (ref 3.5–5.3)
POTASSIUM SERPL-SCNC: 4.1 MMOL/L — SIGNIFICANT CHANGE UP (ref 3.5–5.3)
PROT SERPL-MCNC: 8.3 GM/DL — SIGNIFICANT CHANGE UP (ref 6–8.3)
RBC # BLD: 4.29 M/UL — SIGNIFICANT CHANGE UP (ref 3.8–5.2)
RBC # FLD: 13.3 % — SIGNIFICANT CHANGE UP (ref 10.3–14.5)
SODIUM SERPL-SCNC: 140 MMOL/L — SIGNIFICANT CHANGE UP (ref 135–145)
TROPONIN I, HIGH SENSITIVITY RESULT: 3.3 NG/L — SIGNIFICANT CHANGE UP
WBC # BLD: 6.67 K/UL — SIGNIFICANT CHANGE UP (ref 3.8–10.5)
WBC # FLD AUTO: 6.67 K/UL — SIGNIFICANT CHANGE UP (ref 3.8–10.5)

## 2022-07-28 PROCEDURE — 93010 ELECTROCARDIOGRAM REPORT: CPT

## 2022-07-28 PROCEDURE — 71046 X-RAY EXAM CHEST 2 VIEWS: CPT | Mod: 26

## 2022-07-28 PROCEDURE — 99285 EMERGENCY DEPT VISIT HI MDM: CPT

## 2022-07-28 PROCEDURE — 93971 EXTREMITY STUDY: CPT | Mod: 26

## 2022-07-28 PROCEDURE — 71275 CT ANGIOGRAPHY CHEST: CPT | Mod: 26,MA

## 2022-07-28 RX ORDER — ACETAMINOPHEN 500 MG
975 TABLET ORAL ONCE
Refills: 0 | Status: COMPLETED | OUTPATIENT
Start: 2022-07-28 | End: 2022-07-28

## 2022-07-28 RX ADMIN — Medication 975 MILLIGRAM(S): at 09:20

## 2022-07-28 NOTE — ED PROVIDER NOTE - CLINICAL SUMMARY MEDICAL DECISION MAKING FREE TEXT BOX
63yo female presenting with left sided chest pain.  Well appearing in NAD.  ECG nonischemic.  Eval for acs, had normal cardiac testing this month.  With recent travel and some LE edema, will send dimer for dvt/ pe although less likely.  No abd ttp.  Not c/w aortic pathology and less likely acute abdominal pathology.  Reassess after results.

## 2022-07-28 NOTE — ED ADULT TRIAGE NOTE - CHIEF COMPLAINT QUOTE
patient c/o of chest pain radiating into the back started yesterday no dizziness no difficulty breathing at the time of triage

## 2022-07-28 NOTE — ED ADULT TRIAGE NOTE - PRO INTERPRETER NEED 2
CXR from 2/14/19 showed: The heart and pulmonary vessels are normal.  The lungs are clear.  No evidence for  pneumonia or pleural effusion.  Postsurgical or inflammatory arthritic changes of the right AC   joint.       English

## 2022-07-28 NOTE — ED PROVIDER NOTE - NS_EDPROVIDERDISPOUSERTYPE_ED_A_ED
Pt reports 3/10 constant generalized abdominal pain flaring occasionally to 8/10 radiating to esophagus x 2 weeks; reports seeing PCP on 12/24/17, was told that there was no conclusive evidence of infection but was prescribed antibiotics which are still being taken.  Pt reports no relief of symptoms.  Pt reports history of diverticulitis; reports still having gallbladder, appendix.  Pt reports occasional fevers; currently afebrile.  On assessment, pt appears calm and relaxed; abdomen is round, tender to touch, bowel sounds auscultated in all four quadrants.     Carlo Conteh RN  01/01/18 0932    
Attending Attestation (For Attendings USE Only)...

## 2022-07-28 NOTE — ED ADULT NURSE NOTE - NS ED PATIENT SAFETY CONCERN
Spoke with Sarah Amezcua from , advised SWETA Gates RN will be following up with pt tomorrow.       Judy Willson RN  11/19/18 [id number] No

## 2022-07-28 NOTE — ED ADULT TRIAGE NOTE - CCCP TRG CHIEF CMPLNT
chest pain
Detail Level: Zone
Plan: Last month on oral antibiotics, will start isotretinoin next month

## 2022-07-28 NOTE — ED PROVIDER NOTE - PHYSICAL EXAMINATION
General appearance: NAD, conversant, afebrile    Eyes: anicteric sclerae, ANEL, EOMI   HENT: Atraumatic; oropharynx clear, MMM and no ulcerations, no pharyngeal erythema or exudate   Neck: Trachea midline; Full range of motion, supple   Pulm: CTA bl, normal respiratory effort and no intercostal retractions, normal work of breathing   CV: RRR, No murmurs, rubs, or gallops   Back: No midline ttp, step offs, deformities, no cvat    Abdomen: Soft, non-tender, non-distended; no guarding or rebound   Extremities: No peripheral edema, trace LLE edema to midcalf, no gross deformities, FROM x4, 5/5 MS x4   Skin: Dry, normal temperature, turgor and texture; no rash   Psych: Appropriate affect, cooperative

## 2022-07-28 NOTE — ED PROVIDER NOTE - PROGRESS NOTE DETAILS
Darius: Patient reassessed.  Symptoms improved . Denies complaints at this time.  Labs and imaging unremarkable.  Reviewed results and plan with patient and will dc.  Has cards and pmd to follow up with.

## 2022-07-28 NOTE — ED PROVIDER NOTE - NSFOLLOWUPINSTRUCTIONS_ED_ALL_ED_FT
Rest, drink plenty of fluids  Advance activity as tolerated  Continue all previously prescribed medications as directed  Follow up with your PMD and cardiologist - bring copies of your results  Return to the ER for chest pain, difficulty breathing, or other new or concerning symptoms

## 2022-07-28 NOTE — ED PROVIDER NOTE - PATIENT PORTAL LINK FT
You can access the FollowMyHealth Patient Portal offered by [facility] by registering at the following website: [email or web address]. By joining [employer]’s FollowMyHealth portal, you will also be able to view your health information using other applications (apps) compatible with our system.

## 2022-07-28 NOTE — ED PROVIDER NOTE - OBJECTIVE STATEMENT
63yo female with pmh htn, hld, arthritis, presenting with left sided chest pain radiating to L back.  Started yesterday and have been constant.  No inciting event, felt gassy at first and now sharp.  No sob, n/v/d, fevers, cough, abd pain, urinary symptoms.  Has never had this before.  Echo stress done earlier this month which was normal.  Notes some swelling of LLE but states this is typical 2/2 her arthritis.  Recently returned back from vacation in [place].

## 2022-07-28 NOTE — ED PROVIDER NOTE - NSICDXPASTSURGICALHX_GEN_ALL_CORE_FT
PAST SURGICAL HISTORY:  Acute meniscal tear of knee, unspecified laterality, subsequent encounter

## 2022-07-28 NOTE — ED ADULT NURSE NOTE - OBJECTIVE STATEMENT
Patient A&OX3, breathing even and unlaboredf on room air, no acute respiratory distress noted. PMhx DM, HTN, HLD presented to ED c/o of chest pain radiating into the back started yesterday. denies Dizziness, nausea vomiting. Labs drawn and sent and medication given a sordered

## 2022-11-16 ENCOUNTER — APPOINTMENT (OUTPATIENT)
Dept: OBGYN | Facility: CLINIC | Age: 62
End: 2022-11-16

## 2022-11-16 VITALS
WEIGHT: 185 LBS | HEIGHT: 62 IN | SYSTOLIC BLOOD PRESSURE: 161 MMHG | HEART RATE: 52 BPM | DIASTOLIC BLOOD PRESSURE: 75 MMHG | BODY MASS INDEX: 34.04 KG/M2

## 2022-11-16 DIAGNOSIS — E66.9 OBESITY, UNSPECIFIED: ICD-10-CM

## 2022-11-16 DIAGNOSIS — Z01.419 ENCOUNTER FOR GYNECOLOGICAL EXAMINATION (GENERAL) (ROUTINE) W/OUT ABNORMAL FINDINGS: ICD-10-CM

## 2022-11-16 PROCEDURE — 99396 PREV VISIT EST AGE 40-64: CPT

## 2022-11-16 RX ORDER — OLOPATADINE HYDROCHLORIDE 2 MG/ML
0.2 SOLUTION OPHTHALMIC
Qty: 3 | Refills: 0 | Status: COMPLETED | COMMUNITY
Start: 2017-05-09 | End: 2022-11-16

## 2022-11-16 NOTE — HISTORY OF PRESENT ILLNESS
[FreeTextEntry1] : 62 y.o. P 1011 postmenopausal female, presents for annual exam. . pt feels well, pt reports no vaginal bleeding over the past year. , PMH - HTN  on Losartan and Amlodipine, , followed by Dr. Gilliland, , pt also takes a stain Livalo ,  for full hx of see Nov. 2021 annual exam.  pt has family hx of Cancer, but pts COLOR testing of 2021 shows no mutations.

## 2022-11-16 NOTE — DISCUSSION/SUMMARY
[FreeTextEntry1] : A - WwV\par      obesity\par      HTN\par     menopause\par \par p_ f/u 1 year\par     pap done ( pt request)\par     mammo 8/1/22 Birads-1\par     Dexa  next due in 2023\par    colonoscopy next due in 2026\par     exercise encouraged\par    nutritional counseling provided\par   pt advised to be compliant with  antihypertensives. \par

## 2022-11-18 LAB — HPV HIGH+LOW RISK DNA PNL CVX: NOT DETECTED

## 2022-11-28 LAB — CYTOLOGY CVX/VAG DOC THIN PREP: ABNORMAL

## 2022-12-09 ENCOUNTER — NON-APPOINTMENT (OUTPATIENT)
Age: 62
End: 2022-12-09

## 2023-12-12 ENCOUNTER — APPOINTMENT (OUTPATIENT)
Dept: OBGYN | Facility: CLINIC | Age: 63
End: 2023-12-12
Payer: COMMERCIAL

## 2023-12-12 VITALS
BODY MASS INDEX: 35.15 KG/M2 | WEIGHT: 191 LBS | HEIGHT: 62 IN | DIASTOLIC BLOOD PRESSURE: 77 MMHG | HEART RATE: 94 BPM | SYSTOLIC BLOOD PRESSURE: 144 MMHG

## 2023-12-12 DIAGNOSIS — Z01.419 ENCOUNTER FOR GYNECOLOGICAL EXAMINATION (GENERAL) (ROUTINE) W/OUT ABNORMAL FINDINGS: ICD-10-CM

## 2023-12-12 DIAGNOSIS — I10 ESSENTIAL (PRIMARY) HYPERTENSION: ICD-10-CM

## 2023-12-12 DIAGNOSIS — Z78.0 ASYMPTOMATIC MENOPAUSAL STATE: ICD-10-CM

## 2023-12-12 DIAGNOSIS — E78.00 PURE HYPERCHOLESTEROLEMIA, UNSPECIFIED: ICD-10-CM

## 2023-12-12 PROCEDURE — 99396 PREV VISIT EST AGE 40-64: CPT

## 2023-12-12 RX ORDER — MELATONIN 3 MG
TABLET ORAL
Refills: 0 | Status: ACTIVE | COMMUNITY

## 2023-12-12 RX ORDER — EZETIMIBE 10 MG/1
10 TABLET ORAL
Refills: 0 | Status: ACTIVE | COMMUNITY

## 2023-12-12 RX ORDER — AMLODIPINE BESYLATE 5 MG/1
TABLET ORAL
Refills: 0 | Status: ACTIVE | COMMUNITY

## 2023-12-12 RX ORDER — ESOMEPRAZOLE MAGNESIUM 40 MG/1
CAPSULE, DELAYED RELEASE ORAL
Refills: 0 | Status: ACTIVE | COMMUNITY

## 2023-12-15 LAB — HPV HIGH+LOW RISK DNA PNL CVX: NOT DETECTED

## 2023-12-16 LAB — CYTOLOGY CVX/VAG DOC THIN PREP: ABNORMAL

## 2024-03-20 DIAGNOSIS — Z13.820 ENCOUNTER FOR SCREENING FOR OSTEOPOROSIS: ICD-10-CM

## 2024-03-20 DIAGNOSIS — Z12.39 ENCOUNTER FOR OTHER SCREENING FOR MALIGNANT NEOPLASM OF BREAST: ICD-10-CM

## 2024-04-19 ENCOUNTER — NON-APPOINTMENT (OUTPATIENT)
Age: 64
End: 2024-04-19

## 2024-12-13 ENCOUNTER — APPOINTMENT (OUTPATIENT)
Dept: OBGYN | Facility: CLINIC | Age: 64
End: 2024-12-13
Payer: COMMERCIAL

## 2024-12-13 VITALS
WEIGHT: 195 LBS | HEART RATE: 67 BPM | HEIGHT: 62 IN | DIASTOLIC BLOOD PRESSURE: 77 MMHG | SYSTOLIC BLOOD PRESSURE: 165 MMHG | BODY MASS INDEX: 35.88 KG/M2

## 2024-12-13 DIAGNOSIS — I10 ESSENTIAL (PRIMARY) HYPERTENSION: ICD-10-CM

## 2024-12-13 DIAGNOSIS — Z01.419 ENCOUNTER FOR GYNECOLOGICAL EXAMINATION (GENERAL) (ROUTINE) W/OUT ABNORMAL FINDINGS: ICD-10-CM

## 2024-12-13 DIAGNOSIS — N39.46 MIXED INCONTINENCE: ICD-10-CM

## 2024-12-13 DIAGNOSIS — Z78.0 ASYMPTOMATIC MENOPAUSAL STATE: ICD-10-CM

## 2024-12-13 PROCEDURE — 99396 PREV VISIT EST AGE 40-64: CPT

## 2024-12-13 PROCEDURE — 99459 PELVIC EXAMINATION: CPT

## 2024-12-13 RX ORDER — ROSUVASTATIN CALCIUM 5 MG/1
TABLET, FILM COATED ORAL
Refills: 0 | Status: ACTIVE | COMMUNITY

## 2024-12-13 RX ORDER — AMLODIPINE AND ATORVASTATIN 2.5; 4 MG/1; MG/1
TABLET, COATED ORAL
Refills: 0 | Status: ACTIVE | COMMUNITY

## 2024-12-17 LAB — BACTERIA UR CULT: NORMAL

## 2025-02-11 ENCOUNTER — APPOINTMENT (OUTPATIENT)
Dept: UROGYNECOLOGY | Facility: CLINIC | Age: 65
End: 2025-02-11
Payer: COMMERCIAL

## 2025-02-11 VITALS
SYSTOLIC BLOOD PRESSURE: 111 MMHG | OXYGEN SATURATION: 97 % | DIASTOLIC BLOOD PRESSURE: 60 MMHG | HEART RATE: 65 BPM | HEIGHT: 62 IN

## 2025-02-11 DIAGNOSIS — N95.8 OTHER SPECIFIED MENOPAUSAL AND PERIMENOPAUSAL DISORDERS: ICD-10-CM

## 2025-02-11 DIAGNOSIS — G47.33 OBSTRUCTIVE SLEEP APNEA (ADULT) (PEDIATRIC): ICD-10-CM

## 2025-02-11 DIAGNOSIS — N39.41 URGE INCONTINENCE: ICD-10-CM

## 2025-02-11 DIAGNOSIS — N32.81 OVERACTIVE BLADDER: ICD-10-CM

## 2025-02-11 LAB
BILIRUB UR QL STRIP: NORMAL
CLARITY UR: CLEAR
COLLECTION METHOD: NORMAL
GLUCOSE UR-MCNC: NORMAL
HCG UR QL: 0.2 EU/DL
HGB UR QL STRIP.AUTO: NORMAL
KETONES UR-MCNC: NORMAL
LEUKOCYTE ESTERASE UR QL STRIP: NORMAL
NITRITE UR QL STRIP: NORMAL
PH UR STRIP: 5.5
PROT UR STRIP-MCNC: NORMAL
SP GR UR STRIP: >=1.03

## 2025-02-11 PROCEDURE — 99459 PELVIC EXAMINATION: CPT

## 2025-02-11 PROCEDURE — 99204 OFFICE O/P NEW MOD 45 MIN: CPT | Mod: 25

## 2025-02-11 PROCEDURE — 51701 INSERT BLADDER CATHETER: CPT

## 2025-02-11 RX ORDER — ESTRADIOL 0.1 MG/G
0.1 CREAM VAGINAL
Qty: 1 | Refills: 3 | Status: ACTIVE | COMMUNITY
Start: 2025-02-11 | End: 1900-01-01

## 2025-02-19 DIAGNOSIS — Z83.3 FAMILY HISTORY OF DIABETES MELLITUS: ICD-10-CM

## 2025-02-19 DIAGNOSIS — Z87.09 PERSONAL HISTORY OF OTHER DISEASES OF THE RESPIRATORY SYSTEM: ICD-10-CM

## 2025-02-19 DIAGNOSIS — Z83.438 FAMILY HISTORY OF OTHER DISORDER OF LIPOPROTEIN METABOLISM AND OTHER LIPIDEMIA: ICD-10-CM

## 2025-02-19 DIAGNOSIS — Z82.3 FAMILY HISTORY OF STROKE: ICD-10-CM

## 2025-02-19 DIAGNOSIS — Z82.49 FAMILY HISTORY OF ISCHEMIC HEART DISEASE AND OTHER DISEASES OF THE CIRCULATORY SYSTEM: ICD-10-CM

## 2025-02-19 DIAGNOSIS — Z86.39 PERSONAL HISTORY OF OTHER ENDOCRINE, NUTRITIONAL AND METABOLIC DISEASE: ICD-10-CM

## 2025-02-19 DIAGNOSIS — Z87.01 PERSONAL HISTORY OF PNEUMONIA (RECURRENT): ICD-10-CM

## 2025-02-19 DIAGNOSIS — Z86.79 PERSONAL HISTORY OF OTHER DISEASES OF THE CIRCULATORY SYSTEM: ICD-10-CM

## 2025-02-19 RX ORDER — EZETIMIBE 10 MG/1
10 TABLET ORAL
Refills: 0 | Status: ACTIVE | COMMUNITY

## 2025-02-19 RX ORDER — PITAVASTATIN CALCIUM 4.18 MG/1
4 TABLET, FILM COATED ORAL
Refills: 0 | Status: ACTIVE | COMMUNITY

## 2025-04-29 ENCOUNTER — APPOINTMENT (OUTPATIENT)
Dept: UROGYNECOLOGY | Facility: CLINIC | Age: 65
End: 2025-04-29
Payer: COMMERCIAL

## 2025-04-29 DIAGNOSIS — N39.41 URGE INCONTINENCE: ICD-10-CM

## 2025-04-29 DIAGNOSIS — N95.8 OTHER SPECIFIED MENOPAUSAL AND PERIMENOPAUSAL DISORDERS: ICD-10-CM

## 2025-04-29 DIAGNOSIS — N32.81 OVERACTIVE BLADDER: ICD-10-CM

## 2025-04-29 PROCEDURE — 99212 OFFICE O/P EST SF 10 MIN: CPT | Mod: 95
